# Patient Record
Sex: MALE | Race: WHITE | NOT HISPANIC OR LATINO | Employment: OTHER | URBAN - METROPOLITAN AREA
[De-identification: names, ages, dates, MRNs, and addresses within clinical notes are randomized per-mention and may not be internally consistent; named-entity substitution may affect disease eponyms.]

---

## 2017-12-27 ENCOUNTER — PREPPED CHART (OUTPATIENT)
Dept: URBAN - METROPOLITAN AREA CLINIC 43 | Facility: CLINIC | Age: 77
End: 2017-12-27

## 2017-12-27 ASSESSMENT — VISUAL ACUITY
OS_CC: 20/25
OD_CC: 20/60+2

## 2017-12-27 ASSESSMENT — TONOMETRY
OD_IOP_MMHG: 10
OS_IOP_MMHG: 11

## 2018-01-10 ENCOUNTER — ESTABLISHED COMPREHENSIVE EXAM (OUTPATIENT)
Dept: URBAN - METROPOLITAN AREA CLINIC 43 | Facility: CLINIC | Age: 78
End: 2018-01-10

## 2018-01-10 DIAGNOSIS — H21.89: ICD-10-CM

## 2018-01-10 DIAGNOSIS — H35.3132: ICD-10-CM

## 2018-01-10 DIAGNOSIS — H35.371: ICD-10-CM

## 2018-01-10 DIAGNOSIS — H43.813: ICD-10-CM

## 2018-01-10 DIAGNOSIS — H34.12: ICD-10-CM

## 2018-01-10 DIAGNOSIS — H35.413: ICD-10-CM

## 2018-01-10 PROCEDURE — 2019F DILATED MACUL EXAM DONE: CPT

## 2018-01-10 PROCEDURE — 92014 COMPRE OPH EXAM EST PT 1/>: CPT

## 2018-01-10 PROCEDURE — 92287 ANT SGM IMG IR FLRSCN ANGRPH: CPT

## 2018-01-10 PROCEDURE — 4177F TALK PT/CRGVR RE AREDS PREV: CPT

## 2018-01-10 PROCEDURE — 92235 FLUORESCEIN ANGRPH MLTIFRAME: CPT

## 2018-01-10 PROCEDURE — 92250 FUNDUS PHOTOGRAPHY W/I&R: CPT

## 2018-01-10 PROCEDURE — G8785 BP SCRN NO PERF AT INTERVAL: HCPCS

## 2018-01-10 PROCEDURE — 92134 CPTRZ OPH DX IMG PST SGM RTA: CPT

## 2018-01-10 PROCEDURE — 1036F TOBACCO NON-USER: CPT

## 2018-01-10 PROCEDURE — G8428 CUR MEDS NOT DOCUMENT: HCPCS

## 2018-01-10 ASSESSMENT — TONOMETRY
OD_IOP_MMHG: 7
OS_IOP_MMHG: 9

## 2018-01-10 ASSESSMENT — VISUAL ACUITY
OD_CC: 20/40
OS_CC: 20/30-2

## 2018-03-07 ENCOUNTER — ESTABLISHED PATIENT (OUTPATIENT)
Dept: URBAN - METROPOLITAN AREA CLINIC 43 | Facility: CLINIC | Age: 78
End: 2018-03-07

## 2018-03-07 DIAGNOSIS — H35.413: ICD-10-CM

## 2018-03-07 DIAGNOSIS — G45.9: ICD-10-CM

## 2018-03-07 DIAGNOSIS — H43.813: ICD-10-CM

## 2018-03-07 DIAGNOSIS — H34.12: ICD-10-CM

## 2018-03-07 DIAGNOSIS — H35.371: ICD-10-CM

## 2018-03-07 DIAGNOSIS — H35.723: ICD-10-CM

## 2018-03-07 DIAGNOSIS — H21.89: ICD-10-CM

## 2018-03-07 DIAGNOSIS — H35.3112: ICD-10-CM

## 2018-03-07 DIAGNOSIS — H35.30: ICD-10-CM

## 2018-03-07 DIAGNOSIS — H35.3122: ICD-10-CM

## 2018-03-07 PROCEDURE — 4177F TALK PT/CRGVR RE AREDS PREV: CPT

## 2018-03-07 PROCEDURE — G8428 CUR MEDS NOT DOCUMENT: HCPCS

## 2018-03-07 PROCEDURE — 9222650 BILAT EXTENDED OPHTHALMOSCOPY, F/U

## 2018-03-07 PROCEDURE — 92134 CPTRZ OPH DX IMG PST SGM RTA: CPT

## 2018-03-07 PROCEDURE — 92012 INTRM OPH EXAM EST PATIENT: CPT

## 2018-03-07 PROCEDURE — G8785 BP SCRN NO PERF AT INTERVAL: HCPCS

## 2018-03-07 PROCEDURE — 1036F TOBACCO NON-USER: CPT

## 2018-03-07 PROCEDURE — 2019F DILATED MACUL EXAM DONE: CPT

## 2018-03-07 ASSESSMENT — VISUAL ACUITY
OD_CC: 20/70-2
OS_PH: 20/30
OS_CC: 20/50+2
OD_PH: 20/50-1

## 2018-03-07 ASSESSMENT — TONOMETRY
OD_IOP_MMHG: 11
OS_IOP_MMHG: 08

## 2018-03-28 ENCOUNTER — VISUAL FIELD (OUTPATIENT)
Dept: URBAN - METROPOLITAN AREA CLINIC 43 | Facility: CLINIC | Age: 78
End: 2018-03-28

## 2018-03-28 DIAGNOSIS — H35.3122: ICD-10-CM

## 2018-03-28 DIAGNOSIS — H35.3112: ICD-10-CM

## 2018-03-28 PROCEDURE — 92083 EXTENDED VISUAL FIELD XM: CPT

## 2018-03-28 PROCEDURE — 99211T TECH SERVICE

## 2018-04-18 ENCOUNTER — ESTABLISHED PATIENT (OUTPATIENT)
Dept: URBAN - METROPOLITAN AREA CLINIC 43 | Facility: CLINIC | Age: 78
End: 2018-04-18

## 2018-04-18 DIAGNOSIS — H35.371: ICD-10-CM

## 2018-04-18 DIAGNOSIS — H43.813: ICD-10-CM

## 2018-04-18 DIAGNOSIS — H35.413: ICD-10-CM

## 2018-04-18 DIAGNOSIS — H35.30: ICD-10-CM

## 2018-04-18 DIAGNOSIS — H35.723: ICD-10-CM

## 2018-04-18 DIAGNOSIS — H35.3122: ICD-10-CM

## 2018-04-18 DIAGNOSIS — H34.12: ICD-10-CM

## 2018-04-18 DIAGNOSIS — H35.3112: ICD-10-CM

## 2018-04-18 DIAGNOSIS — H21.89: ICD-10-CM

## 2018-04-18 PROCEDURE — 9222650 BILAT EXTENDED OPHTHALMOSCOPY, F/U

## 2018-04-18 PROCEDURE — G8785 BP SCRN NO PERF AT INTERVAL: HCPCS

## 2018-04-18 PROCEDURE — 4177F TALK PT/CRGVR RE AREDS PREV: CPT

## 2018-04-18 PROCEDURE — 92287 ANT SGM IMG IR FLRSCN ANGRPH: CPT

## 2018-04-18 PROCEDURE — 1036F TOBACCO NON-USER: CPT

## 2018-04-18 PROCEDURE — G8427 DOCREV CUR MEDS BY ELIG CLIN: HCPCS

## 2018-04-18 PROCEDURE — 2019F DILATED MACUL EXAM DONE: CPT

## 2018-04-18 PROCEDURE — 92014 COMPRE OPH EXAM EST PT 1/>: CPT

## 2018-04-18 PROCEDURE — 92242 FLUORESCEIN&ICG ANGIOGRAPHY: CPT

## 2018-04-18 PROCEDURE — 92134 CPTRZ OPH DX IMG PST SGM RTA: CPT

## 2018-04-18 ASSESSMENT — VISUAL ACUITY
OS_PH: 20/30+2
OD_PH: 20/60
OS_CC: 20/40-2
OD_CC: 20/70

## 2018-04-18 ASSESSMENT — TONOMETRY
OD_IOP_MMHG: 5
OS_IOP_MMHG: 6

## 2018-07-18 ENCOUNTER — ESTABLISHED PATIENT (OUTPATIENT)
Dept: URBAN - METROPOLITAN AREA CLINIC 43 | Facility: CLINIC | Age: 78
End: 2018-07-18

## 2018-07-18 DIAGNOSIS — H35.3112: ICD-10-CM

## 2018-07-18 DIAGNOSIS — H35.3122: ICD-10-CM

## 2018-07-18 PROCEDURE — 99211T TECH SERVICE

## 2018-07-18 PROCEDURE — 92083 EXTENDED VISUAL FIELD XM: CPT

## 2018-08-22 ENCOUNTER — ESTABLISHED PATIENT (OUTPATIENT)
Dept: URBAN - METROPOLITAN AREA CLINIC 43 | Facility: CLINIC | Age: 78
End: 2018-08-22

## 2018-08-22 DIAGNOSIS — H21.89: ICD-10-CM

## 2018-08-22 DIAGNOSIS — H35.723: ICD-10-CM

## 2018-08-22 DIAGNOSIS — H35.413: ICD-10-CM

## 2018-08-22 DIAGNOSIS — H35.3122: ICD-10-CM

## 2018-08-22 DIAGNOSIS — H35.3112: ICD-10-CM

## 2018-08-22 DIAGNOSIS — H43.813: ICD-10-CM

## 2018-08-22 DIAGNOSIS — H35.371: ICD-10-CM

## 2018-08-22 PROCEDURE — 92242 FLUORESCEIN&ICG ANGIOGRAPHY: CPT

## 2018-08-22 PROCEDURE — 4177F TALK PT/CRGVR RE AREDS PREV: CPT

## 2018-08-22 PROCEDURE — 92134 CPTRZ OPH DX IMG PST SGM RTA: CPT

## 2018-08-22 PROCEDURE — G8785 BP SCRN NO PERF AT INTERVAL: HCPCS

## 2018-08-22 PROCEDURE — 9222650 BILAT EXTENDED OPHTHALMOSCOPY, F/U

## 2018-08-22 PROCEDURE — G9903 PT SCRN TBCO ID AS NON USER: HCPCS

## 2018-08-22 PROCEDURE — 2019F DILATED MACUL EXAM DONE: CPT

## 2018-08-22 PROCEDURE — G8428 CUR MEDS NOT DOCUMENT: HCPCS

## 2018-08-22 PROCEDURE — 1036F TOBACCO NON-USER: CPT

## 2018-08-22 PROCEDURE — 92012 INTRM OPH EXAM EST PATIENT: CPT

## 2018-08-22 PROCEDURE — G9974 MAC EXAM PERF: HCPCS

## 2018-08-22 ASSESSMENT — TONOMETRY
OD_IOP_MMHG: 08
OS_IOP_MMHG: 07

## 2018-08-22 ASSESSMENT — VISUAL ACUITY
OD_PH: 20/50-1
OS_CC: 20/40+2
OD_CC: 20/60

## 2019-02-25 ENCOUNTER — ESTABLISHED PATIENT (OUTPATIENT)
Dept: URBAN - METROPOLITAN AREA CLINIC 43 | Facility: CLINIC | Age: 79
End: 2019-02-25

## 2019-02-25 DIAGNOSIS — H35.3122: ICD-10-CM

## 2019-02-25 DIAGNOSIS — H35.723: ICD-10-CM

## 2019-02-25 DIAGNOSIS — H35.413: ICD-10-CM

## 2019-02-25 DIAGNOSIS — H35.3112: ICD-10-CM

## 2019-02-25 DIAGNOSIS — H35.371: ICD-10-CM

## 2019-02-25 DIAGNOSIS — H21.89: ICD-10-CM

## 2019-02-25 PROCEDURE — 92226 OPHTHALMOSCOPY (SUB): CPT

## 2019-02-25 PROCEDURE — 92134 CPTRZ OPH DX IMG PST SGM RTA: CPT

## 2019-02-25 PROCEDURE — 92242 FLUORESCEIN&ICG ANGIOGRAPHY: CPT

## 2019-02-25 PROCEDURE — 92014 COMPRE OPH EXAM EST PT 1/>: CPT

## 2019-02-25 ASSESSMENT — TONOMETRY
OS_IOP_MMHG: 10
OD_IOP_MMHG: 11

## 2019-02-25 ASSESSMENT — VISUAL ACUITY
OS_SC: 20/40-2
OD_SC: 20/80+1

## 2019-02-28 ENCOUNTER — ESTABLISHED COMPREHENSIVE EXAM (OUTPATIENT)
Dept: URBAN - METROPOLITAN AREA CLINIC 43 | Facility: CLINIC | Age: 79
End: 2019-02-28

## 2019-02-28 DIAGNOSIS — H43.813: ICD-10-CM

## 2019-02-28 DIAGNOSIS — H35.3122: ICD-10-CM

## 2019-02-28 DIAGNOSIS — G45.9: ICD-10-CM

## 2019-02-28 DIAGNOSIS — H35.723: ICD-10-CM

## 2019-02-28 DIAGNOSIS — H35.371: ICD-10-CM

## 2019-02-28 DIAGNOSIS — H35.413: ICD-10-CM

## 2019-02-28 DIAGNOSIS — H35.3112: ICD-10-CM

## 2019-02-28 PROCEDURE — 92014 COMPRE OPH EXAM EST PT 1/>: CPT

## 2019-02-28 PROCEDURE — 92015 DETERMINE REFRACTIVE STATE: CPT

## 2019-02-28 ASSESSMENT — TONOMETRY
OD_IOP_MMHG: 11
OS_IOP_MMHG: 11

## 2019-02-28 ASSESSMENT — VISUAL ACUITY
OS_CC: 20/30-1
OS_BAT: 20/200
OD_BAT: 20/400
OS_SC: 20/400
OS_CC: J1
OD_SC: J10
OD_CC: 20/60-2
OS_SC: J1
OD_CC: J4
OD_SC: 20/70-2

## 2019-09-09 ENCOUNTER — ESTABLISHED COMPREHENSIVE EXAM (OUTPATIENT)
Dept: URBAN - METROPOLITAN AREA CLINIC 43 | Facility: CLINIC | Age: 79
End: 2019-09-09

## 2019-09-09 DIAGNOSIS — H35.371: ICD-10-CM

## 2019-09-09 DIAGNOSIS — H26.493: ICD-10-CM

## 2019-09-09 DIAGNOSIS — H35.723: ICD-10-CM

## 2019-09-09 DIAGNOSIS — H21.89: ICD-10-CM

## 2019-09-09 DIAGNOSIS — H35.413: ICD-10-CM

## 2019-09-09 DIAGNOSIS — H35.3122: ICD-10-CM

## 2019-09-09 DIAGNOSIS — H35.3112: ICD-10-CM

## 2019-09-09 DIAGNOSIS — H34.11: ICD-10-CM

## 2019-09-09 DIAGNOSIS — H43.813: ICD-10-CM

## 2019-09-09 DIAGNOSIS — H35.30: ICD-10-CM

## 2019-09-09 PROCEDURE — 92235 FLUORESCEIN ANGRPH MLTIFRAME: CPT

## 2019-09-09 PROCEDURE — 92287 ANT SGM IMG IR FLRSCN ANGRPH: CPT

## 2019-09-09 PROCEDURE — 92014 COMPRE OPH EXAM EST PT 1/>: CPT

## 2019-09-09 PROCEDURE — 92250 FUNDUS PHOTOGRAPHY W/I&R: CPT

## 2019-09-09 PROCEDURE — 92226 OPHTHALMOSCOPY (SUB): CPT

## 2019-09-09 ASSESSMENT — TONOMETRY
OS_IOP_MMHG: 16
OD_IOP_MMHG: 08

## 2019-09-09 ASSESSMENT — VISUAL ACUITY
OD_CC: 20/60+2
OS_CC: 20/40+1

## 2020-02-28 ENCOUNTER — ESTABLISHED COMPREHENSIVE EXAM (OUTPATIENT)
Dept: URBAN - METROPOLITAN AREA CLINIC 43 | Facility: CLINIC | Age: 80
End: 2020-02-28

## 2020-02-28 DIAGNOSIS — H34.11: ICD-10-CM

## 2020-02-28 DIAGNOSIS — H21.89: ICD-10-CM

## 2020-02-28 DIAGNOSIS — H35.413: ICD-10-CM

## 2020-02-28 DIAGNOSIS — H35.371: ICD-10-CM

## 2020-02-28 DIAGNOSIS — H35.3112: ICD-10-CM

## 2020-02-28 DIAGNOSIS — H26.493: ICD-10-CM

## 2020-02-28 DIAGNOSIS — H43.813: ICD-10-CM

## 2020-02-28 DIAGNOSIS — H35.723: ICD-10-CM

## 2020-02-28 DIAGNOSIS — H35.3122: ICD-10-CM

## 2020-02-28 PROCEDURE — 92014 COMPRE OPH EXAM EST PT 1/>: CPT

## 2020-02-28 PROCEDURE — 92015 DETERMINE REFRACTIVE STATE: CPT

## 2020-02-28 PROCEDURE — 92134 CPTRZ OPH DX IMG PST SGM RTA: CPT

## 2020-02-28 ASSESSMENT — VISUAL ACUITY
OS_PH: 20/40
OD_PH: 20/60
OD_CC: J8
OD_SC: 20/100
OS_BAT: 20/100
OS_SC: J1
OD_SC: J12
OD_BAT: >20/400
OS_CC: 20/50+2
OD_CC: 20/70
OS_CC: J2
OS_SC: 20/200

## 2020-02-28 ASSESSMENT — TONOMETRY
OS_IOP_MMHG: 16
OD_IOP_MMHG: 10

## 2020-03-03 ENCOUNTER — YAG EVALUATION (OUTPATIENT)
Dept: URBAN - METROPOLITAN AREA CLINIC 39 | Facility: CLINIC | Age: 80
End: 2020-03-03

## 2020-03-03 ENCOUNTER — SURGERY/PROCEDURE (OUTPATIENT)
Dept: URBAN - METROPOLITAN AREA SURGERY 14 | Facility: SURGERY | Age: 80
End: 2020-03-03

## 2020-03-03 DIAGNOSIS — H26.493: ICD-10-CM

## 2020-03-03 PROCEDURE — 92014 COMPRE OPH EXAM EST PT 1/>: CPT

## 2020-03-03 PROCEDURE — 6682150 YAG CAPSULOTOMY

## 2020-03-03 ASSESSMENT — VISUAL ACUITY
OD_CC: 20/50-1
OS_CC: 20/40-1
OS_BAT: 20/100
OD_BAT: 20/<400

## 2020-03-03 ASSESSMENT — TONOMETRY
OS_IOP_MMHG: 11
OD_IOP_MMHG: 11

## 2020-03-09 ENCOUNTER — YAG POST-OP (OUTPATIENT)
Dept: URBAN - METROPOLITAN AREA CLINIC 43 | Facility: CLINIC | Age: 80
End: 2020-03-09

## 2020-03-09 DIAGNOSIS — H35.3122: ICD-10-CM

## 2020-03-09 DIAGNOSIS — Z98.890: ICD-10-CM

## 2020-03-09 PROCEDURE — 99024 POSTOP FOLLOW-UP VISIT: CPT

## 2020-03-09 ASSESSMENT — VISUAL ACUITY
OS_SC: 20/200
OD_SC: 20/80-1
OS_SC: J1
OD_SC: J10

## 2020-03-09 ASSESSMENT — TONOMETRY
OD_IOP_MMHG: 10
OS_IOP_MMHG: 15

## 2020-09-14 ENCOUNTER — ESTABLISHED PATIENT (OUTPATIENT)
Dept: URBAN - METROPOLITAN AREA CLINIC 43 | Facility: CLINIC | Age: 80
End: 2020-09-14

## 2020-09-14 DIAGNOSIS — H35.723: ICD-10-CM

## 2020-09-14 DIAGNOSIS — H43.813: ICD-10-CM

## 2020-09-14 DIAGNOSIS — H35.3122: ICD-10-CM

## 2020-09-14 DIAGNOSIS — H35.413: ICD-10-CM

## 2020-09-14 DIAGNOSIS — H35.371: ICD-10-CM

## 2020-09-14 DIAGNOSIS — H35.3112: ICD-10-CM

## 2020-09-14 DIAGNOSIS — H21.89: ICD-10-CM

## 2020-09-14 DIAGNOSIS — H34.11: ICD-10-CM

## 2020-09-14 DIAGNOSIS — G45.9: ICD-10-CM

## 2020-09-14 PROCEDURE — 92134 CPTRZ OPH DX IMG PST SGM RTA: CPT

## 2020-09-14 PROCEDURE — 92242 FLUORESCEIN&ICG ANGIOGRAPHY: CPT

## 2020-09-14 PROCEDURE — 92014 COMPRE OPH EXAM EST PT 1/>: CPT

## 2020-09-14 PROCEDURE — 92202 OPSCPY EXTND ON/MAC DRAW: CPT

## 2020-09-14 ASSESSMENT — TONOMETRY
OS_IOP_MMHG: 10
OD_IOP_MMHG: 8

## 2020-09-14 ASSESSMENT — VISUAL ACUITY
OS_CC: 20/30
OD_CC: 20/80

## 2021-03-15 ENCOUNTER — ESTABLISHED COMPREHENSIVE EXAM (OUTPATIENT)
Dept: URBAN - METROPOLITAN AREA CLINIC 43 | Facility: CLINIC | Age: 81
End: 2021-03-15

## 2021-03-15 DIAGNOSIS — H35.3122: ICD-10-CM

## 2021-03-15 DIAGNOSIS — H35.30: ICD-10-CM

## 2021-03-15 DIAGNOSIS — G45.9: ICD-10-CM

## 2021-03-15 DIAGNOSIS — H35.371: ICD-10-CM

## 2021-03-15 DIAGNOSIS — H34.11: ICD-10-CM

## 2021-03-15 DIAGNOSIS — H21.89: ICD-10-CM

## 2021-03-15 DIAGNOSIS — H35.723: ICD-10-CM

## 2021-03-15 DIAGNOSIS — H35.3112: ICD-10-CM

## 2021-03-15 DIAGNOSIS — H35.413: ICD-10-CM

## 2021-03-15 DIAGNOSIS — H43.813: ICD-10-CM

## 2021-03-15 PROCEDURE — 92134 CPTRZ OPH DX IMG PST SGM RTA: CPT

## 2021-03-15 PROCEDURE — 99214 OFFICE O/P EST MOD 30 MIN: CPT

## 2021-03-15 PROCEDURE — 92273 FULL FIELD ERG W/I&R: CPT

## 2021-03-15 PROCEDURE — 92242 FLUORESCEIN&ICG ANGIOGRAPHY: CPT

## 2021-03-15 PROCEDURE — 92202 OPSCPY EXTND ON/MAC DRAW: CPT

## 2021-03-15 ASSESSMENT — VISUAL ACUITY
OS_CC: 20/30
OD_PH: 20/60-2
OD_CC: 20/70-2

## 2021-03-15 ASSESSMENT — TONOMETRY
OD_IOP_MMHG: 10
OS_IOP_MMHG: 09

## 2021-09-13 ENCOUNTER — ESTABLISHED COMPREHENSIVE EXAM (OUTPATIENT)
Dept: URBAN - METROPOLITAN AREA CLINIC 43 | Facility: CLINIC | Age: 81
End: 2021-09-13

## 2021-09-13 DIAGNOSIS — H35.371: ICD-10-CM

## 2021-09-13 DIAGNOSIS — H35.413: ICD-10-CM

## 2021-09-13 DIAGNOSIS — G45.9: ICD-10-CM

## 2021-09-13 DIAGNOSIS — H21.89: ICD-10-CM

## 2021-09-13 DIAGNOSIS — H35.723: ICD-10-CM

## 2021-09-13 DIAGNOSIS — H43.813: ICD-10-CM

## 2021-09-13 DIAGNOSIS — H34.11: ICD-10-CM

## 2021-09-13 DIAGNOSIS — H35.3122: ICD-10-CM

## 2021-09-13 DIAGNOSIS — H35.3112: ICD-10-CM

## 2021-09-13 PROCEDURE — 92250 FUNDUS PHOTOGRAPHY W/I&R: CPT

## 2021-09-13 PROCEDURE — 92235 FLUORESCEIN ANGRPH MLTIFRAME: CPT

## 2021-09-13 PROCEDURE — 99214 OFFICE O/P EST MOD 30 MIN: CPT

## 2021-09-13 ASSESSMENT — TONOMETRY
OD_IOP_MMHG: 08
OS_IOP_MMHG: 10

## 2021-09-13 ASSESSMENT — VISUAL ACUITY
OD_CC: 20/200+1
OD_PH: 20/70
OS_CC: 20/30-2

## 2022-03-14 ENCOUNTER — COMPREHENSIVE EXAM (OUTPATIENT)
Dept: URBAN - METROPOLITAN AREA CLINIC 43 | Facility: CLINIC | Age: 82
End: 2022-03-14

## 2022-03-14 DIAGNOSIS — H21.89: ICD-10-CM

## 2022-03-14 DIAGNOSIS — H35.3112: ICD-10-CM

## 2022-03-14 DIAGNOSIS — H35.413: ICD-10-CM

## 2022-03-14 DIAGNOSIS — H35.371: ICD-10-CM

## 2022-03-14 DIAGNOSIS — Z96.1: ICD-10-CM

## 2022-03-14 DIAGNOSIS — H34.11: ICD-10-CM

## 2022-03-14 DIAGNOSIS — H35.3122: ICD-10-CM

## 2022-03-14 DIAGNOSIS — G45.9: ICD-10-CM

## 2022-03-14 DIAGNOSIS — H33.321: ICD-10-CM

## 2022-03-14 DIAGNOSIS — H43.813: ICD-10-CM

## 2022-03-14 DIAGNOSIS — H43.393: ICD-10-CM

## 2022-03-14 DIAGNOSIS — H35.723: ICD-10-CM

## 2022-03-14 PROCEDURE — 92014 COMPRE OPH EXAM EST PT 1/>: CPT

## 2022-03-14 PROCEDURE — 92015 DETERMINE REFRACTIVE STATE: CPT

## 2022-03-14 ASSESSMENT — VISUAL ACUITY
OD_SC: J12
OS_CC: 20/25-2
OD_CC: J2
OD_SC: 20/80-1
OS_SC: 20/200
OS_SC: J1
OS_CC: J1
OD_CC: 20/60-2

## 2022-03-14 ASSESSMENT — TONOMETRY
OS_IOP_MMHG: 11
OD_IOP_MMHG: 9

## 2023-04-12 ENCOUNTER — COMPREHENSIVE EXAM (OUTPATIENT)
Dept: URBAN - METROPOLITAN AREA CLINIC 43 | Facility: CLINIC | Age: 83
End: 2023-04-12

## 2023-04-12 DIAGNOSIS — H35.371: ICD-10-CM

## 2023-04-12 DIAGNOSIS — Z96.1: ICD-10-CM

## 2023-04-12 DIAGNOSIS — H35.723: ICD-10-CM

## 2023-04-12 DIAGNOSIS — H35.413: ICD-10-CM

## 2023-04-12 DIAGNOSIS — G45.3: ICD-10-CM

## 2023-04-12 DIAGNOSIS — H21.89: ICD-10-CM

## 2023-04-12 DIAGNOSIS — G45.9: ICD-10-CM

## 2023-04-12 DIAGNOSIS — H43.393: ICD-10-CM

## 2023-04-12 DIAGNOSIS — H43.813: ICD-10-CM

## 2023-04-12 DIAGNOSIS — H35.3132: ICD-10-CM

## 2023-04-12 DIAGNOSIS — H33.321: ICD-10-CM

## 2023-04-12 DIAGNOSIS — H35.30: ICD-10-CM

## 2023-04-12 PROCEDURE — 92015 DETERMINE REFRACTIVE STATE: CPT

## 2023-04-12 PROCEDURE — 92014 COMPRE OPH EXAM EST PT 1/>: CPT

## 2023-04-12 PROCEDURE — 92250 FUNDUS PHOTOGRAPHY W/I&R: CPT

## 2023-04-12 ASSESSMENT — TONOMETRY
OS_IOP_MMHG: 10
OD_IOP_MMHG: 10

## 2023-04-12 ASSESSMENT — VISUAL ACUITY
OS_CC: J1
OS_CC: 20/25-2
OD_SC: 20/100
OU_SC: 20/70
OS_SC: 20/200+1
OU_CC: 20/25-2
OS_SC: J2
OD_CC: 20/80
OU_SC: J1
OU_CC: J1
OD_CC: J4
OD_SC: J10
OD_PH: 20/50-2

## 2024-04-16 ENCOUNTER — COMPREHENSIVE EXAM (OUTPATIENT)
Dept: URBAN - METROPOLITAN AREA CLINIC 43 | Facility: CLINIC | Age: 84
End: 2024-04-16

## 2024-04-16 DIAGNOSIS — H35.3132: ICD-10-CM

## 2024-04-16 DIAGNOSIS — H35.413: ICD-10-CM

## 2024-04-16 DIAGNOSIS — H35.371: ICD-10-CM

## 2024-04-16 DIAGNOSIS — Z96.1: ICD-10-CM

## 2024-04-16 DIAGNOSIS — H43.813: ICD-10-CM

## 2024-04-16 DIAGNOSIS — H35.30: ICD-10-CM

## 2024-04-16 DIAGNOSIS — G45.3: ICD-10-CM

## 2024-04-16 DIAGNOSIS — H43.393: ICD-10-CM

## 2024-04-16 DIAGNOSIS — H21.89: ICD-10-CM

## 2024-04-16 DIAGNOSIS — H33.321: ICD-10-CM

## 2024-04-16 DIAGNOSIS — G45.9: ICD-10-CM

## 2024-04-16 DIAGNOSIS — H35.723: ICD-10-CM

## 2024-04-16 PROCEDURE — 92015 DETERMINE REFRACTIVE STATE: CPT

## 2024-04-16 PROCEDURE — 92014 COMPRE OPH EXAM EST PT 1/>: CPT

## 2024-04-16 PROCEDURE — 92134 CPTRZ OPH DX IMG PST SGM RTA: CPT

## 2024-04-16 ASSESSMENT — TONOMETRY
OD_IOP_MMHG: 11
OS_IOP_MMHG: 11

## 2024-04-16 ASSESSMENT — VISUAL ACUITY
OS_SC: J3
OD_CC: J6-
OD_SC: J12
OS_CC: 20/20-1
OD_CC: 20/60
OD_SC: 20/70-2
OS_CC: J2
OS_SC: 20/100+1

## 2025-01-09 ENCOUNTER — APPOINTMENT (OUTPATIENT)
Dept: URBAN - METROPOLITAN AREA CLINIC 137 | Facility: CLINIC | Age: 85
Setting detail: DERMATOLOGY
End: 2025-01-09

## 2025-01-09 DIAGNOSIS — L82.0 INFLAMED SEBORRHEIC KERATOSIS: ICD-10-CM

## 2025-01-09 PROCEDURE — ? COUNSELING

## 2025-01-09 PROCEDURE — 17110 DESTRUCTION B9 LES UP TO 14: CPT

## 2025-01-09 PROCEDURE — ? LIQUID NITROGEN

## 2025-01-09 ASSESSMENT — LOCATION DETAILED DESCRIPTION DERM
LOCATION DETAILED: RIGHT MID PREAURICULAR CHEEK
LOCATION DETAILED: RIGHT SUPERIOR PREAURICULAR CHEEK
LOCATION DETAILED: RIGHT INFERIOR LATERAL FOREHEAD

## 2025-01-09 ASSESSMENT — LOCATION ZONE DERM: LOCATION ZONE: FACE

## 2025-01-09 ASSESSMENT — LOCATION SIMPLE DESCRIPTION DERM
LOCATION SIMPLE: RIGHT CHEEK
LOCATION SIMPLE: RIGHT FOREHEAD

## 2025-01-09 NOTE — PROCEDURE: LIQUID NITROGEN
Include Z78.9 (Other Specified Conditions Influencing Health Status) As An Associated Diagnosis?: No
Duration Of Freeze Thaw-Cycle (Seconds): 2
Show Topical Anesthesia Variable?: Yes
Post-Care Instructions: I reviewed with the patient in detail post-care instructions. Patient is to wear sunprotection, and avoid picking at any of the treated lesions. Pt may apply Vaseline to crusted or scabbing areas.
Spray Paint Text: The liquid nitrogen was applied to the skin utilizing a spray paint frosting technique.
Number Of Freeze-Thaw Cycles: 2 freeze-thaw cycles
Medical Necessity Clause: This procedure was medically necessary because the lesions that were treated were:
Medical Necessity Information: It is in your best interest to select a reason for this procedure from the list below. All of these items fulfill various CMS LCD requirements except the new and changing color options.
Detail Level: Detailed
Consent: The patient's consent was obtained including but not limited to risks of crusting, scabbing, blistering, scarring, darker or lighter pigmentary change, recurrence, incomplete removal and infection.

## 2025-04-21 ENCOUNTER — COMPREHENSIVE EXAM (OUTPATIENT)
Age: 85
End: 2025-04-21

## 2025-04-21 DIAGNOSIS — H35.3132: ICD-10-CM

## 2025-04-21 DIAGNOSIS — G45.9: ICD-10-CM

## 2025-04-21 DIAGNOSIS — H21.89: ICD-10-CM

## 2025-04-21 DIAGNOSIS — H33.321: ICD-10-CM

## 2025-04-21 DIAGNOSIS — H35.371: ICD-10-CM

## 2025-04-21 DIAGNOSIS — H43.813: ICD-10-CM

## 2025-04-21 DIAGNOSIS — H35.413: ICD-10-CM

## 2025-04-21 DIAGNOSIS — H35.723: ICD-10-CM

## 2025-04-21 DIAGNOSIS — Z96.1: ICD-10-CM

## 2025-04-21 DIAGNOSIS — H35.30: ICD-10-CM

## 2025-04-21 DIAGNOSIS — H43.393: ICD-10-CM

## 2025-04-21 DIAGNOSIS — G45.3: ICD-10-CM

## 2025-04-21 PROCEDURE — 92015 DETERMINE REFRACTIVE STATE: CPT

## 2025-04-21 PROCEDURE — 92134 CPTRZ OPH DX IMG PST SGM RTA: CPT

## 2025-04-21 PROCEDURE — 92014 COMPRE OPH EXAM EST PT 1/>: CPT
